# Patient Record
Sex: FEMALE | Race: WHITE | NOT HISPANIC OR LATINO | Employment: FULL TIME | URBAN - METROPOLITAN AREA
[De-identification: names, ages, dates, MRNs, and addresses within clinical notes are randomized per-mention and may not be internally consistent; named-entity substitution may affect disease eponyms.]

---

## 2023-09-24 PROBLEM — M54.9 UPPER BACK PAIN: Status: ACTIVE | Noted: 2023-09-24

## 2023-09-24 PROBLEM — N76.0 BACTERIAL VAGINOSIS: Status: ACTIVE | Noted: 2023-09-24

## 2023-09-24 PROBLEM — E78.5 HYPERLIPIDEMIA: Status: ACTIVE | Noted: 2023-09-24

## 2023-09-24 PROBLEM — F32.A DEPRESSION: Status: ACTIVE | Noted: 2023-09-24

## 2023-09-24 PROBLEM — J11.1 INFLUENZA: Status: ACTIVE | Noted: 2023-09-24

## 2023-09-24 PROBLEM — H93.13 TINNITUS, BILATERAL: Status: ACTIVE | Noted: 2023-09-24

## 2023-09-24 PROBLEM — R93.0 ABNORMAL MRI OF HEAD: Status: ACTIVE | Noted: 2023-09-24

## 2023-09-24 PROBLEM — R00.2 PALPITATIONS: Status: ACTIVE | Noted: 2023-09-24

## 2023-09-24 PROBLEM — K52.9 GASTROENTERITIS: Status: ACTIVE | Noted: 2023-09-24

## 2023-09-24 PROBLEM — H90.3 SENSORINEURAL HEARING LOSS, BILATERAL: Status: ACTIVE | Noted: 2023-09-24

## 2023-09-24 PROBLEM — H91.90 HEARING LOSS: Status: ACTIVE | Noted: 2023-09-24

## 2023-09-24 PROBLEM — F90.9 ATTENTION-DEFICIT/HYPERACTIVITY DISORDER: Status: ACTIVE | Noted: 2023-09-24

## 2023-09-24 PROBLEM — R10.13 EPIGASTRIC PAIN: Status: ACTIVE | Noted: 2023-09-24

## 2023-09-24 PROBLEM — R35.0 URINARY FREQUENCY: Status: ACTIVE | Noted: 2023-09-24

## 2023-09-24 PROBLEM — D49.6 BRAIN TUMOR (MULTI): Status: ACTIVE | Noted: 2023-09-24

## 2023-09-24 PROBLEM — C80.1 CANCER (MULTI): Status: ACTIVE | Noted: 2023-09-24

## 2023-09-24 PROBLEM — R41.89 ALTERATION IN COGNITION: Status: ACTIVE | Noted: 2023-09-24

## 2023-09-24 PROBLEM — R87.610 ASCUS WITH POSITIVE HIGH RISK HPV CERVICAL: Status: ACTIVE | Noted: 2023-09-24

## 2023-09-24 PROBLEM — H69.90 ETD (EUSTACHIAN TUBE DYSFUNCTION): Status: ACTIVE | Noted: 2023-09-24

## 2023-09-24 PROBLEM — R53.83 FATIGUE: Status: ACTIVE | Noted: 2023-09-24

## 2023-09-24 PROBLEM — R42 DIZZINESS: Status: ACTIVE | Noted: 2023-09-24

## 2023-09-24 PROBLEM — E03.9 HYPOTHYROIDISM: Status: ACTIVE | Noted: 2023-09-24

## 2023-09-24 PROBLEM — E55.9 VITAMIN D DEFICIENCY: Status: ACTIVE | Noted: 2023-09-24

## 2023-09-24 PROBLEM — N93.9 ABNORMAL UTERINE BLEEDING (AUB): Status: ACTIVE | Noted: 2023-09-24

## 2023-09-24 PROBLEM — R10.9 ABDOMINAL PAIN: Status: ACTIVE | Noted: 2023-09-24

## 2023-09-24 PROBLEM — R07.0 THROAT PAIN: Status: ACTIVE | Noted: 2023-09-24

## 2023-09-24 PROBLEM — I63.9 CEREBROVASCULAR ACCIDENT (CVA) (MULTI): Status: ACTIVE | Noted: 2023-09-24

## 2023-09-24 PROBLEM — H93.8X3 SENSATION OF FULLNESS IN BOTH EARS: Status: ACTIVE | Noted: 2023-09-24

## 2023-09-24 PROBLEM — T50.905A ADVERSE EFFECT OF DRUG/MEDICINAL: Status: ACTIVE | Noted: 2023-09-24

## 2023-09-24 PROBLEM — B07.0 PLANTAR WART: Status: ACTIVE | Noted: 2019-09-11

## 2023-09-24 PROBLEM — N92.1 IRREGULAR INTERMENSTRUAL BLEEDING: Status: ACTIVE | Noted: 2023-09-24

## 2023-09-24 PROBLEM — L21.9 SEBORRHEIC DERMATITIS, UNSPECIFIED: Status: ACTIVE | Noted: 2019-09-11

## 2023-09-24 PROBLEM — E67.3 HYPERVITAMINOSIS D: Status: ACTIVE | Noted: 2023-09-24

## 2023-09-24 PROBLEM — H66.91 OTITIS MEDIA OF RIGHT EAR: Status: ACTIVE | Noted: 2023-09-24

## 2023-09-24 PROBLEM — R87.810 ASCUS WITH POSITIVE HIGH RISK HPV CERVICAL: Status: ACTIVE | Noted: 2023-09-24

## 2023-09-24 PROBLEM — E87.6 HYPOKALEMIA: Status: ACTIVE | Noted: 2023-09-24

## 2023-09-24 PROBLEM — B96.89 BACTERIAL VAGINOSIS: Status: ACTIVE | Noted: 2023-09-24

## 2023-09-24 RX ORDER — LEVOTHYROXINE SODIUM 112 UG/1
0.5 TABLET ORAL
COMMUNITY
Start: 2013-04-04 | End: 2023-11-30 | Stop reason: SDUPTHER

## 2023-09-24 RX ORDER — ASPIRIN 81 MG/1
81 TABLET ORAL
COMMUNITY
Start: 2023-07-10

## 2023-09-24 RX ORDER — FLUTICASONE PROPIONATE 50 MCG
SPRAY, SUSPENSION (ML) NASAL
COMMUNITY

## 2023-09-24 RX ORDER — KETOCONAZOLE 20 MG/ML
SHAMPOO, SUSPENSION TOPICAL
COMMUNITY
Start: 2017-09-19

## 2023-09-24 RX ORDER — FAMOTIDINE 20 MG/1
20 TABLET, FILM COATED ORAL EVERY 12 HOURS PRN
COMMUNITY
Start: 2021-12-15

## 2023-09-24 RX ORDER — ONDANSETRON 8 MG/1
8 TABLET, ORALLY DISINTEGRATING ORAL EVERY 8 HOURS PRN
COMMUNITY
Start: 2023-09-15

## 2023-09-24 RX ORDER — ADHESIVE TAPE 1.5"X360"
TAPE, NON-MEDICATED TOPICAL
COMMUNITY
Start: 2022-03-18

## 2023-09-24 RX ORDER — ERGOCALCIFEROL 1.25 MG/1
1.25 CAPSULE ORAL
COMMUNITY
Start: 2018-05-18

## 2023-10-24 ENCOUNTER — APPOINTMENT (OUTPATIENT)
Dept: PRIMARY CARE | Facility: CLINIC | Age: 33
End: 2023-10-24
Payer: COMMERCIAL

## 2023-11-20 ENCOUNTER — TELEPHONE (OUTPATIENT)
Dept: PRIMARY CARE | Facility: CLINIC | Age: 33
End: 2023-11-20
Payer: COMMERCIAL

## 2023-11-20 NOTE — TELEPHONE ENCOUNTER
Patient stated she just had blood work done at [facility] and she will fax you the results. She cancelled her appointment with you in October. Refused to make another appointment. Requesting a prescription levothyroxine 112 mcg to be sent to [facility] at [facility].

## 2023-11-30 DIAGNOSIS — E03.9 HYPOTHYROIDISM, UNSPECIFIED TYPE: Primary | ICD-10-CM

## 2023-11-30 RX ORDER — LEVOTHYROXINE SODIUM 112 UG/1
56 TABLET ORAL
Qty: 30 TABLET | Refills: 1 | Status: SHIPPED | OUTPATIENT
Start: 2023-11-30 | End: 2024-04-11

## 2023-11-30 NOTE — TELEPHONE ENCOUNTER
Pt called requesting renewal.  Pt also asking about her lab results.  Please advise.  LG to call back.

## 2024-04-11 DIAGNOSIS — E03.9 HYPOTHYROIDISM, UNSPECIFIED TYPE: ICD-10-CM

## 2024-04-11 RX ORDER — LEVOTHYROXINE SODIUM 112 UG/1
TABLET ORAL
Qty: 30 TABLET | Refills: 0 | Status: SHIPPED | OUTPATIENT
Start: 2024-04-11 | End: 2024-06-11

## 2024-06-11 DIAGNOSIS — E03.9 HYPOTHYROIDISM, UNSPECIFIED TYPE: ICD-10-CM

## 2024-06-11 RX ORDER — LEVOTHYROXINE SODIUM 112 UG/1
56 TABLET ORAL DAILY
Qty: 30 TABLET | Refills: 0 | Status: SHIPPED | OUTPATIENT
Start: 2024-06-11

## 2024-08-15 ENCOUNTER — APPOINTMENT (OUTPATIENT)
Dept: AUDIOLOGY | Facility: CLINIC | Age: 34
End: 2024-08-15
Payer: COMMERCIAL